# Patient Record
Sex: MALE | Race: WHITE | NOT HISPANIC OR LATINO | Employment: OTHER | URBAN - METROPOLITAN AREA
[De-identification: names, ages, dates, MRNs, and addresses within clinical notes are randomized per-mention and may not be internally consistent; named-entity substitution may affect disease eponyms.]

---

## 2017-08-24 ENCOUNTER — OFFICE VISIT (OUTPATIENT)
Dept: INTERNAL MEDICINE | Facility: CLINIC | Age: 72
End: 2017-08-24

## 2017-08-24 VITALS
SYSTOLIC BLOOD PRESSURE: 124 MMHG | RESPIRATION RATE: 21 BRPM | WEIGHT: 163 LBS | DIASTOLIC BLOOD PRESSURE: 84 MMHG | HEIGHT: 70 IN | BODY MASS INDEX: 23.34 KG/M2 | HEART RATE: 70 BPM

## 2017-08-24 DIAGNOSIS — R73.03 BORDERLINE DIABETES: ICD-10-CM

## 2017-08-24 DIAGNOSIS — G20 IDIOPATHIC PARKINSON'S DISEASE (HCC): ICD-10-CM

## 2017-08-24 DIAGNOSIS — E78.2 MIXED HYPERLIPIDEMIA: Primary | ICD-10-CM

## 2017-08-24 PROBLEM — M25.559 ARTHRALGIA OF HIP: Status: RESOLVED | Noted: 2017-08-24 | Resolved: 2017-08-24

## 2017-08-24 PROBLEM — M25.559 ARTHRALGIA OF HIP: Status: ACTIVE | Noted: 2017-08-24

## 2017-08-24 PROBLEM — E78.5 HLD (HYPERLIPIDEMIA): Status: ACTIVE | Noted: 2017-08-24

## 2017-08-24 PROCEDURE — 83036 HEMOGLOBIN GLYCOSYLATED A1C: CPT | Performed by: INTERNAL MEDICINE

## 2017-08-24 PROCEDURE — 99214 OFFICE O/P EST MOD 30 MIN: CPT | Performed by: INTERNAL MEDICINE

## 2017-08-24 RX ORDER — SILDENAFIL 100 MG/1
100 TABLET, FILM COATED ORAL AS NEEDED
Qty: 12 TABLET | Refills: 5 | OUTPATIENT
Start: 2017-08-24 | End: 2021-12-24

## 2017-08-24 RX ORDER — LEVODOPA AND CARBIDOPA 95; 23.75 MG/1; MG/1
CAPSULE, EXTENDED RELEASE ORAL
Refills: 6 | COMMUNITY
Start: 2017-08-17 | End: 2021-12-24

## 2017-08-24 NOTE — PROGRESS NOTES
Subjective   Braden Verdugo Jr is a 72 y.o. male.     History of Present Illness   For follow up of  Parkinson's disease.  He had brain stimulator place about one and 1/2 years ago and has done great.  He is down to one medication.  He had the stimulator repositioned and battery change last week.  Hyperlipidemia has not been checked for a while.  Hx of borderline diabetes and no new sx.    The following portions of the patient's history were reviewed and updated as appropriate: allergies, current medications, past medical history and problem list.    Review of Systems   Constitutional: Negative.    Respiratory: Negative.  Negative for cough, chest tightness and wheezing.    Cardiovascular: Negative.  Negative for chest pain, palpitations and leg swelling.   Gastrointestinal: Negative.  Negative for abdominal distention and abdominal pain.   Musculoskeletal: Positive for gait problem.   Neurological: Positive for tremors (mild parkinsonian tremor.) and speech difficulty.        Flat affect.       Objective   Physical Exam   Constitutional: He appears well-developed and well-nourished.   Neck: Normal range of motion. Neck supple.   Cardiovascular: Normal rate, regular rhythm and normal heart sounds.  Exam reveals no gallop and no friction rub.    No murmur heard.  Pulmonary/Chest: Effort normal and breath sounds normal. No respiratory distress. He has no wheezes. He has no rales. He exhibits no tenderness.   Abdominal: Soft. Bowel sounds are normal. He exhibits no distension. There is no tenderness.   Neurological:   Mild parkinsonian tremor left > right.  Flat affect.  Slow to get gait started.   Nursing note and vitals reviewed.      Assessment/Plan   Braden was seen today for establish care.    Diagnoses and all orders for this visit:    Mixed hyperlipidemia    Borderline diabetes    Idiopathic Parkinson's disease    Other orders  -     sildenafil (VIAGRA) 100 MG tablet; Take 1 tablet by mouth As Needed for erectile  dysfunction. NO FURTHER REFILLS UNTIL APPT WITH PCP    All problems are stable.  Same meds.  A1C = 5.3.  Recheck 6 months.

## 2017-08-25 LAB
EXPIRATION DATE: NORMAL
HBA1C MFR BLD: 5.3 %
Lab: NORMAL

## 2018-04-09 ENCOUNTER — OFFICE VISIT (OUTPATIENT)
Dept: INTERNAL MEDICINE | Facility: CLINIC | Age: 73
End: 2018-04-09

## 2018-04-09 VITALS
HEART RATE: 84 BPM | RESPIRATION RATE: 18 BRPM | WEIGHT: 161 LBS | SYSTOLIC BLOOD PRESSURE: 128 MMHG | DIASTOLIC BLOOD PRESSURE: 80 MMHG | BODY MASS INDEX: 23.1 KG/M2

## 2018-04-09 DIAGNOSIS — Z12.5 SCREENING FOR PROSTATE CANCER: ICD-10-CM

## 2018-04-09 DIAGNOSIS — R73.03 BORDERLINE DIABETES: ICD-10-CM

## 2018-04-09 DIAGNOSIS — F32.9 REACTIVE DEPRESSION: ICD-10-CM

## 2018-04-09 DIAGNOSIS — E78.2 MIXED HYPERLIPIDEMIA: Primary | ICD-10-CM

## 2018-04-09 DIAGNOSIS — N40.1 BENIGN PROSTATIC HYPERPLASIA WITH LOWER URINARY TRACT SYMPTOMS, SYMPTOM DETAILS UNSPECIFIED: ICD-10-CM

## 2018-04-09 DIAGNOSIS — N52.9 ERECTILE DYSFUNCTION OF ORGANIC ORIGIN: ICD-10-CM

## 2018-04-09 DIAGNOSIS — G20 IDIOPATHIC PARKINSON'S DISEASE (HCC): ICD-10-CM

## 2018-04-09 DIAGNOSIS — Z79.899 DRUG THERAPY: ICD-10-CM

## 2018-04-09 LAB
ALBUMIN SERPL-MCNC: 4.7 G/DL (ref 3.2–4.8)
ALBUMIN/GLOB SERPL: 2.1 G/DL (ref 1.5–2.5)
ALP SERPL-CCNC: 124 U/L (ref 25–100)
ALT SERPL W P-5'-P-CCNC: 6 U/L (ref 7–40)
ANION GAP SERPL CALCULATED.3IONS-SCNC: 7 MMOL/L (ref 3–11)
ARTICHOKE IGE QN: 115 MG/DL (ref 0–130)
AST SERPL-CCNC: 22 U/L (ref 0–33)
BILIRUB SERPL-MCNC: 1.2 MG/DL (ref 0.3–1.2)
BUN BLD-MCNC: 18 MG/DL (ref 9–23)
BUN/CREAT SERPL: 18 (ref 7–25)
CALCIUM SPEC-SCNC: 9.4 MG/DL (ref 8.7–10.4)
CHLORIDE SERPL-SCNC: 103 MMOL/L (ref 99–109)
CHOLEST SERPL-MCNC: 183 MG/DL (ref 0–200)
CO2 SERPL-SCNC: 30 MMOL/L (ref 20–31)
CREAT BLD-MCNC: 1 MG/DL (ref 0.6–1.3)
DEPRECATED RDW RBC AUTO: 44.7 FL (ref 37–54)
ERYTHROCYTE [DISTWIDTH] IN BLOOD BY AUTOMATED COUNT: 13.8 % (ref 11.3–14.5)
GFR SERPL CREATININE-BSD FRML MDRD: 73 ML/MIN/1.73
GLOBULIN UR ELPH-MCNC: 2.2 GM/DL
GLUCOSE BLD-MCNC: 107 MG/DL (ref 70–100)
HBA1C MFR BLD: 6.1 % (ref 4.8–5.6)
HCT VFR BLD AUTO: 45.9 % (ref 38.9–50.9)
HDLC SERPL-MCNC: 55 MG/DL (ref 40–60)
HGB BLD-MCNC: 15.3 G/DL (ref 13.1–17.5)
MCH RBC QN AUTO: 29.5 PG (ref 27–31)
MCHC RBC AUTO-ENTMCNC: 33.3 G/DL (ref 32–36)
MCV RBC AUTO: 88.6 FL (ref 80–99)
PLATELET # BLD AUTO: 268 10*3/MM3 (ref 150–450)
PMV BLD AUTO: 11 FL (ref 6–12)
POTASSIUM BLD-SCNC: 4.3 MMOL/L (ref 3.5–5.5)
PROT SERPL-MCNC: 6.9 G/DL (ref 5.7–8.2)
PSA SERPL-MCNC: 1.33 NG/ML (ref 0–4)
RBC # BLD AUTO: 5.18 10*6/MM3 (ref 4.2–5.76)
SODIUM BLD-SCNC: 140 MMOL/L (ref 132–146)
TESTOST SERPL-MCNC: 290.74 NG/DL (ref 86.98–780.1)
TRIGL SERPL-MCNC: 70 MG/DL (ref 0–150)
WBC NRBC COR # BLD: 8.98 10*3/MM3 (ref 3.5–10.8)

## 2018-04-09 PROCEDURE — 80053 COMPREHEN METABOLIC PANEL: CPT | Performed by: INTERNAL MEDICINE

## 2018-04-09 PROCEDURE — 36415 COLL VENOUS BLD VENIPUNCTURE: CPT | Performed by: INTERNAL MEDICINE

## 2018-04-09 PROCEDURE — 99214 OFFICE O/P EST MOD 30 MIN: CPT | Performed by: INTERNAL MEDICINE

## 2018-04-09 PROCEDURE — 83036 HEMOGLOBIN GLYCOSYLATED A1C: CPT | Performed by: INTERNAL MEDICINE

## 2018-04-09 PROCEDURE — 84403 ASSAY OF TOTAL TESTOSTERONE: CPT | Performed by: INTERNAL MEDICINE

## 2018-04-09 PROCEDURE — 80061 LIPID PANEL: CPT | Performed by: INTERNAL MEDICINE

## 2018-04-09 PROCEDURE — G0103 PSA SCREENING: HCPCS | Performed by: INTERNAL MEDICINE

## 2018-04-09 PROCEDURE — 85027 COMPLETE CBC AUTOMATED: CPT | Performed by: INTERNAL MEDICINE

## 2018-04-09 RX ORDER — TAMSULOSIN HYDROCHLORIDE 0.4 MG/1
1 CAPSULE ORAL NIGHTLY
Qty: 30 CAPSULE | Refills: 5 | OUTPATIENT
Start: 2018-04-09 | End: 2021-12-24

## 2018-04-09 RX ORDER — VENLAFAXINE 75 MG/1
150 TABLET ORAL DAILY
Refills: 2 | COMMUNITY
Start: 2018-03-05 | End: 2022-10-04

## 2018-04-09 NOTE — PROGRESS NOTES
Subjective   Braden Verdugo Jr is a 72 y.o. male.     History of Present Illness   For follow up of  Parkinson's disease on medication and brain stimulation followed at .  Depression now on effexor and stable.  BPH has to get up 3 times at night..  Borderline sugar and lipids, no sx.  ED is not responsive to viagra.    The following portions of the patient's history were reviewed and updated as appropriate: allergies, current medications, past family history, past medical history, past social history, past surgical history and problem list.    Review of Systems   Constitutional: Negative.  Negative for activity change, appetite change, fatigue and fever.   HENT: Negative.  Negative for dental problem, ear pain, hearing loss, postnasal drip, rhinorrhea, sinus pressure, sore throat, trouble swallowing and voice change.    Eyes: Negative.  Negative for redness and visual disturbance.   Respiratory: Negative.  Negative for cough, chest tightness, shortness of breath and wheezing.    Cardiovascular: Negative.  Negative for chest pain, palpitations and leg swelling.   Gastrointestinal: Negative.  Negative for abdominal distention, abdominal pain, blood in stool, constipation, diarrhea and nausea.   Endocrine: Negative.  Negative for polydipsia and polyuria.   Genitourinary: Positive for frequency. Negative for decreased urine volume, dysuria, hematuria and urgency.   Musculoskeletal: Negative.  Negative for arthralgias, back pain and neck pain.   Skin: Negative.  Negative for pallor and rash.   Allergic/Immunologic: Negative.    Neurological: Positive for tremors (some tremors and shuffling gait.  Weak voice.). Negative for dizziness, speech difficulty, weakness, numbness, headaches and confusion.   Hematological: Negative.  Negative for adenopathy.   Psychiatric/Behavioral: Negative.  Negative for agitation, behavioral problems, dysphoric mood, sleep disturbance, negative for hyperactivity and depressed mood. The patient  is not nervous/anxious.        Objective   Physical Exam   Constitutional: He is oriented to person, place, and time. He appears well-developed and well-nourished.   Weak voice and shuffling gait.   HENT:   Head: Normocephalic and atraumatic.   Right Ear: External ear normal.   Left Ear: External ear normal.   Nose: Nose normal.   Mouth/Throat: Oropharynx is clear and moist.   Eyes: Conjunctivae and EOM are normal. Pupils are equal, round, and reactive to light.   Fundoscopic exam:       The right eye shows no AV nicking and no hemorrhage.        The left eye shows no AV nicking and no hemorrhage.   Neck: Normal range of motion. Neck supple. No thyromegaly present.   Cardiovascular: Normal rate, regular rhythm, normal heart sounds and intact distal pulses.  Exam reveals no gallop and no friction rub.    No murmur heard.  Carotids normal.   Pulmonary/Chest: Effort normal and breath sounds normal. No respiratory distress. He has no wheezes. He has no rales. He exhibits no tenderness.   Stimulator in right upper chest.   Abdominal: Soft. Bowel sounds are normal. He exhibits no distension and no mass. There is no tenderness. No hernia.   Genitourinary: Rectum normal, prostate normal, testes normal and penis normal.   Musculoskeletal: Normal range of motion. He exhibits no edema.   Lymphadenopathy:     He has no cervical adenopathy.   Neurological: He is alert and oriented to person, place, and time. He has normal reflexes. No cranial nerve deficit.   As noted parkinson's findings.   Skin: Skin is warm and dry.   Psychiatric: He has a normal mood and affect. His behavior is normal. Judgment and thought content normal.   Nursing note and vitals reviewed.        Assessment/Plan   Braden was seen today for hyperlipidemia.    Diagnoses and all orders for this visit:    Mixed hyperlipidemia  -     Comprehensive Metabolic Panel  -     Lipid Panel    Idiopathic Parkinson's disease    Borderline diabetes  -     Hemoglobin  A1c    Benign prostatic hyperplasia with lower urinary tract symptoms, symptom details unspecified  -     tamsulosin (FLOMAX) 0.4 MG capsule 24 hr capsule; Take 1 capsule by mouth Every Night.    Screening for prostate cancer  -     PSA Screen    Drug therapy  -     CBC (No Diff)    Erectile dysfunction of organic origin  -     Testosterone    Reactive depression    Will try flomax for BPH.  Rest of problems are stable.  Recommended urology if he wants treatment for ED.  Same meds otherwise.  Recheck one year or prn.

## 2018-07-02 ENCOUNTER — TELEPHONE (OUTPATIENT)
Dept: INTERNAL MEDICINE | Facility: CLINIC | Age: 73
End: 2018-07-02

## 2018-07-02 NOTE — TELEPHONE ENCOUNTER
----- Message from Betty Hu sent at 7/2/2018  9:11 AM EDT -----  Contact: WIFE  KITTY ADAN CALLING FOR HER  NENITA ADAN, YESTERDAY HE STARTED HAVING CHILLS AND A HEADACHE. THROUGH THE NIGHT HE STARTED URINATING MORE FREQUENTLY AND NOW THERE IS BLOOD IN HIS URINE AND IT IS PAINFUL TO PEE.   TRANSFERRED CALL TO ELAINA

## 2019-08-26 ENCOUNTER — OFFICE VISIT (OUTPATIENT)
Dept: INTERNAL MEDICINE | Facility: CLINIC | Age: 74
End: 2019-08-26

## 2019-08-26 VITALS
BODY MASS INDEX: 23.91 KG/M2 | SYSTOLIC BLOOD PRESSURE: 102 MMHG | TEMPERATURE: 97.6 F | DIASTOLIC BLOOD PRESSURE: 60 MMHG | HEART RATE: 84 BPM | HEIGHT: 70 IN | WEIGHT: 167 LBS

## 2019-08-26 DIAGNOSIS — G20 IDIOPATHIC PARKINSON'S DISEASE (HCC): Primary | ICD-10-CM

## 2019-08-26 DIAGNOSIS — F32.9 REACTIVE DEPRESSION: ICD-10-CM

## 2019-08-26 DIAGNOSIS — I95.2 HYPOTENSION DUE TO DRUGS: ICD-10-CM

## 2019-08-26 PROCEDURE — 99213 OFFICE O/P EST LOW 20 MIN: CPT | Performed by: INTERNAL MEDICINE

## 2019-08-26 NOTE — PROGRESS NOTES
Subjective   Braden Verdugo Jr is a 74 y.o. male.     History of Present Illness   He has noticed some light headedness when he gets up for a long time.  Has been worse recently.  He has been on Rytary for about one year and his light headedness has been worse since then.  He called the neurology office and they said to come here.  No chest pain, sob or other sx.    The following portions of the patient's history were reviewed and updated as appropriate: allergies, current medications, past medical history, past social history and problem list.    Review of Systems   Constitutional: Negative for fatigue and fever.   Musculoskeletal: Positive for gait problem.   Neurological: Positive for tremors and light-headedness.       Objective   Physical Exam   Constitutional: He appears well-developed and well-nourished.   /60 by me and drops 10 points when standing.   Neck: Normal range of motion. Neck supple.   Cardiovascular: Normal rate and regular rhythm.   Neurological: He is alert.   Nursing note and vitals reviewed.      Assessment/Plan   Braden was seen today for hypotension.    Diagnoses and all orders for this visit:    Idiopathic Parkinson's disease (CMS/[facility])    Reactive depression    Hypotension due to drugs    Most likely the hypotension is related to the Rytary.  He will contact his neurologist.  Also is recommend stopping the flomax if he does not notice a lot of benefit.  He will call with questions.

## 2021-12-24 ENCOUNTER — HOSPITAL ENCOUNTER (EMERGENCY)
Facility: HOSPITAL | Age: 76
Discharge: HOME | End: 2021-12-24
Attending: EMERGENCY MEDICINE | Admitting: EMERGENCY MEDICINE

## 2021-12-24 ENCOUNTER — APPOINTMENT (OUTPATIENT)
Dept: GENERAL RADIOLOGY | Facility: HOSPITAL | Age: 76
End: 2021-12-24

## 2021-12-24 VITALS
BODY MASS INDEX: 21.33 KG/M2 | TEMPERATURE: 98.2 F | OXYGEN SATURATION: 100 % | HEART RATE: 79 BPM | SYSTOLIC BLOOD PRESSURE: 163 MMHG | DIASTOLIC BLOOD PRESSURE: 97 MMHG | WEIGHT: 144 LBS | HEIGHT: 69 IN | RESPIRATION RATE: 16 BRPM
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
## 2021-12-24 DIAGNOSIS — G20 PARKINSON'S DISEASE (HCC): ICD-10-CM

## 2021-12-24 DIAGNOSIS — S43.015A ANTERIOR DISLOCATION OF LEFT SHOULDER, INITIAL ENCOUNTER: Primary | ICD-10-CM

## 2021-12-24 PROCEDURE — 73030 X-RAY EXAM OF SHOULDER: CPT

## 2021-12-24 PROCEDURE — 99152 MOD SED SAME PHYS/QHP 5/>YRS: CPT

## 2021-12-24 PROCEDURE — 99283 EMERGENCY DEPT VISIT LOW MDM: CPT

## 2021-12-24 PROCEDURE — 71045 X-RAY EXAM CHEST 1 VIEW: CPT

## 2021-12-24 PROCEDURE — 96374 THER/PROPH/DIAG INJ IV PUSH: CPT

## 2021-12-24 PROCEDURE — 25010000002 PROPOFOL 10 MG/ML EMULSION: Performed by: EMERGENCY MEDICINE

## 2021-12-24 PROCEDURE — 25010000002 KETOROLAC TROMETHAMINE PER 15 MG: Performed by: EMERGENCY MEDICINE

## 2021-12-24 RX ORDER — RIVASTIGMINE 4.6 MG/24H
1 PATCH, EXTENDED RELEASE TRANSDERMAL DAILY
COMMUNITY

## 2021-12-24 RX ORDER — CLONAZEPAM 0.5 MG/1
1 TABLET ORAL NIGHTLY
COMMUNITY

## 2021-12-24 RX ORDER — KETOROLAC TROMETHAMINE 30 MG/ML
15 INJECTION, SOLUTION INTRAMUSCULAR; INTRAVENOUS ONCE
Status: COMPLETED | OUTPATIENT
Start: 2021-12-24 | End: 2021-12-24

## 2021-12-24 RX ORDER — MELOXICAM 15 MG/1
15 TABLET ORAL DAILY
Qty: 10 TABLET | Refills: 0 | Status: SHIPPED | OUTPATIENT
Start: 2021-12-24

## 2021-12-24 RX ORDER — OXYCODONE AND ACETAMINOPHEN 7.5; 325 MG/1; MG/1
1 TABLET ORAL ONCE
Status: COMPLETED | OUTPATIENT
Start: 2021-12-24 | End: 2021-12-24

## 2021-12-24 RX ORDER — ACETAMINOPHEN 500 MG
1000 TABLET ORAL EVERY 6 HOURS PRN
Qty: 30 TABLET | Refills: 0 | Status: SHIPPED | OUTPATIENT
Start: 2021-12-24

## 2021-12-24 RX ORDER — PROPOFOL 10 MG/ML
100 VIAL (ML) INTRAVENOUS ONCE
Status: COMPLETED | OUTPATIENT
Start: 2021-12-24 | End: 2021-12-24

## 2021-12-24 RX ADMIN — KETOROLAC TROMETHAMINE 15 MG: 30 INJECTION, SOLUTION INTRAMUSCULAR; INTRAVENOUS at 11:54

## 2021-12-24 RX ADMIN — PROPOFOL 60 MG: 10 INJECTION, EMULSION INTRAVENOUS at 13:44

## 2021-12-24 RX ADMIN — OXYCODONE HYDROCHLORIDE AND ACETAMINOPHEN 1 TABLET: 7.5; 325 TABLET ORAL at 11:56

## 2021-12-24 NOTE — ED PROVIDER NOTES
Subjective   Mr. Braden Verdugo Jr is a 76 y.o. male who presents to the ED with c/o fall. He has history of Parkinson's. He is accompanied to the ED by his wife. He had a fall today and now complains of pain to his left shoulder. He did not hit his head. He denies headache, neck pain, back pain, abdominal pain, or vomiting. There are no other acute symptoms at this time.      Fall  Mechanism of injury: fall    Injury location:  Shoulder/arm  Shoulder/arm injury location:  L shoulder  Incident location:  Home  Arrived directly from scene: yes    Fall:     Impact surface:  Hard floor    Point of impact: left shoulder.  Tetanus status:  Unknown  Prior to arrival data:     Loss of consciousness: no      Amnesic to event: no    Associated symptoms: no abdominal pain, no back pain, no headaches, no neck pain and no vomiting        Review of Systems   Gastrointestinal: Negative for abdominal pain and vomiting.   Musculoskeletal: Negative for back pain and neck pain.        Left shoulder pain   Neurological: Negative for headaches.   All other systems reviewed and are negative.      Past Medical History:   Diagnosis Date   • Parkinson's disease (HCC)        No Known Allergies    Past Surgical History:   Procedure Laterality Date   • DEEP BRAIN STIMULATOR PLACEMENT     • HERNIA REPAIR         Family History   Problem Relation Age of Onset   • Cancer Mother         myloma   • Diabetes Father        Social History     Socioeconomic History   • Marital status:    Tobacco Use   • Smoking status: Never Smoker   • Smokeless tobacco: Never Used   Substance and Sexual Activity   • Alcohol use: Yes     Comment: 5x week         Objective   Physical Exam  Vitals and nursing note reviewed.   Constitutional:       General: He is not in acute distress.     Appearance: He is well-developed.   HENT:      Head: Normocephalic and atraumatic.      Nose: Nose normal.   Eyes:      General: No scleral icterus.     Conjunctiva/sclera:  "Conjunctivae normal.   Cardiovascular:      Rate and Rhythm: Normal rate and regular rhythm.      Heart sounds: Normal heart sounds. No murmur heard.      Pulmonary:      Effort: Pulmonary effort is normal. No respiratory distress.      Breath sounds: Normal breath sounds.   Abdominal:      Palpations: Abdomen is soft.      Tenderness: There is no abdominal tenderness.   Musculoskeletal:         General: Normal range of motion.      Cervical back: Normal range of motion and neck supple.      Comments: Tenderness and decreased range of motion of the left shoulder. There is a hematoma over the left shoulder. No neck or back tenderness.   Skin:     General: Skin is warm and dry.   Neurological:      Mental Status: He is alert and oriented to person, place, and time.      Comments: He has Parkinsonian movement.   Psychiatric:         Behavior: Behavior normal.         Upper Extremity Dislocation    Date/Time: 12/24/2021 1:53 PM  Performed by: Jonny Rosales MD  Authorized by: Jonny Rosales MD   Consent: Verbal consent obtained. Written consent obtained.  Risks and benefits: risks, benefits and alternatives were discussed  Consent given by: patient and spouse  Patient understanding: patient states understanding of the procedure being performed  Imaging studies: imaging studies available  Required items: required blood products, implants, devices, and special equipment available  Patient identity confirmed: verbally with patient and provided demographic data  Time out: Immediately prior to procedure a \"time out\" was called to verify the correct patient, procedure, equipment, support staff and site/side marked as required.  Injury location: shoulder  Location details: left shoulder  Injury type: dislocation  Dislocation type: anterior  Hill-Sachs deformity: yes  Chronicity: new  Pre-procedure neurovascular assessment: neurovascularly intact  Pre-procedure distal perfusion: normal    Sedation:  Patient " sedated: yes  Sedation type: moderate (conscious) sedation  Sedatives: propofol and see MAR for details  Sedation start date/time: 12/24/2021 1:42 PM  Sedation end date/time: 12/24/2021 1:52 PM  Vitals: Vital signs were monitored during sedation.    Manipulation performed: yes  Reduction method: FARES techniques.  Reduction successful: yes  X-ray confirmed reduction: yes  Immobilization: sling  Post-procedure neurovascular assessment: post-procedure neurovascularly intact  Post-procedure distal perfusion: normal  Post-procedure neurological function: normal  Post-procedure range of motion: normal  Patient tolerance: patient tolerated the procedure well with no immediate complications               ED Course  ED Course as of 12/24/21 1745   Fri Dec 24, 2021   1352 Successful reduction of the left shoulder under procedural sedation.  Confirmatory imaging pending.  See procedure note for details. [RS]      ED Course User Index  [RS] Jonny Rosales MD     No results found for this or any previous visit (from the past 24 hour(s)).  Note: In addition to lab results from this visit, the labs listed above may include labs taken at another facility or during a different encounter within the last 24 hours. Please correlate lab times with ED admission and discharge times for further clarification of the services performed during this visit.    XR Shoulder 2+ View Left   Preliminary Result   1. Interval reduction of the left shoulder.   2. No evidence of acute bony trauma.   3. Chronic rotator cuff tear.               XR Shoulder 2+ View Left   Preliminary Result   Anterior dislocation of the humerus, with suspected mild   Hill-Sachs deformity. Advanced AC joint DJD noted.               XR Chest 1 View   Preliminary Result   No evidence of active chest disease.                 Vitals:    12/24/21 1421 12/24/21 1424 12/24/21 1425 12/24/21 1428   BP:   163/97    BP Location:       Patient Position:       Pulse: 78 78 77 79    Resp:       Temp:       TempSrc:       SpO2: 100% 100% 100% 100%   Weight:       Height:         Medications   oxyCODONE-acetaminophen (PERCOCET) 7.5-325 MG per tablet 1 tablet (1 tablet Oral Given 12/24/21 1156)   ketorolac (TORADOL) injection 15 mg (15 mg Intramuscular Given 12/24/21 1154)   Propofol (DIPRIVAN) injection 100 mg (60 mg Intravenous Given by Other 12/24/21 1344)     ECG/EMG Results (last 24 hours)     ** No results found for the last 24 hours. **        No orders to display                                              MDM  Number of Diagnoses or Management Options     Amount and/or Complexity of Data Reviewed  Tests in the radiology section of CPT®: reviewed  Independent visualization of images, tracings, or specimens: yes        Final diagnoses:   Anterior dislocation of left shoulder, initial encounter   Fall in home, initial encounter   Parkinson's disease (HCC)     DISCHARGE    Patient discharged in stable condition.    Reviewed implications of results, diagnosis, meds, responsibility to follow up, warning signs and symptoms of possible worsening, potential complications and reasons to return to ER.    Patient/Family voiced understanding of above instructions.    Discussed plan for discharge, as there is no emergent indication for admission.  Pt/family is agreeable and understands need for follow up and possible repeat testing.  Pt/family is aware that discharge does not mean that nothing is wrong but that it indicates no emergency is currently present that requires admission and they must continue care with follow-up as given below or with a physician of their choice.     FOLLOW-UP  Dain Barry MD  [address] [facility] [facility]  [place] [id number] [phone]          [facility]  [address] [facility] [address] [phone]    As needed, If symptoms worsen or ANY concerns.    Jarvis Cruz MD  [facility] [facility]  [place] [id number]  [phone]    Schedule an appointment as soon as possible for a visit            Medication List      New Prescriptions    acetaminophen 500 MG tablet  Commonly known as: TYLENOL  Take 2 tablets by mouth Every 6 (Six) Hours As Needed for Mild Pain  or Moderate Pain .     meloxicam 15 MG tablet  Commonly known as: MOBIC  Take 1 tablet by mouth Daily.        Changed    carbidopa-levodopa  MG per tablet  Commonly known as: SINEMET  What changed: Another medication with the same name was removed. Continue taking this medication, and follow the directions you see here.        Stop    sildenafil 100 MG tablet  Commonly known as: Viagra     tamsulosin 0.4 MG capsule 24 hr capsule  Commonly known as: Flomax           Where to Get Your Medications      These medications were sent to [facility]/pharmacy #[id number] - [place], [place] - [address] [facility] [phone]  - [phone]   [address] [place] [address]    Hours: 24-hours Phone: [phone]   · acetaminophen 500 MG tablet  · meloxicam 15 MG tablet       Documentation assistance provided by dario Raygoza.  Information recorded by the dario was done at my direction and has been verified and validated by me.     Angel Raygoza  12/24/21 1028       Jonny Rosales MD  12/24/21 [id number]

## 2022-10-04 PROCEDURE — 87070 CULTURE OTHR SPECIMN AEROBIC: CPT | Performed by: NURSE PRACTITIONER

## 2022-10-04 PROCEDURE — 87205 SMEAR GRAM STAIN: CPT | Performed by: NURSE PRACTITIONER

## 2022-10-04 PROCEDURE — U0004 COV-19 TEST NON-CDC HGH THRU: HCPCS | Performed by: NURSE PRACTITIONER

## 2022-10-04 PROCEDURE — U0005 INFEC AGEN DETEC AMPLI PROBE: HCPCS | Performed by: NURSE PRACTITIONER

## 2023-01-23 ENCOUNTER — HOSPITAL ENCOUNTER (EMERGENCY)
Facility: HOSPITAL | Age: 78
Discharge: HOME | End: 2023-01-23
Admitting: EMERGENCY MEDICINE
Payer: MEDICARE

## 2023-01-23 ENCOUNTER — APPOINTMENT (OUTPATIENT)
Dept: GENERAL RADIOLOGY | Facility: HOSPITAL | Age: 78
End: 2023-01-23
Payer: MEDICARE

## 2023-01-23 VITALS
HEIGHT: 68 IN | HEART RATE: 70 BPM | SYSTOLIC BLOOD PRESSURE: 171 MMHG | BODY MASS INDEX: 20.31 KG/M2 | DIASTOLIC BLOOD PRESSURE: 93 MMHG | OXYGEN SATURATION: 98 % | TEMPERATURE: 98.8 F | RESPIRATION RATE: 16 BRPM | WEIGHT: 134 LBS

## 2023-01-23 DIAGNOSIS — Z86.69 HISTORY OF PARKINSON'S DISEASE: ICD-10-CM

## 2023-01-23 DIAGNOSIS — M25.511 ACUTE PAIN OF RIGHT SHOULDER: ICD-10-CM

## 2023-01-23 DIAGNOSIS — Z86.39 HISTORY OF HYPERLIPIDEMIA: ICD-10-CM

## 2023-01-23 DIAGNOSIS — S43.014A ANTERIOR SHOULDER DISLOCATION, RIGHT, INITIAL ENCOUNTER: Primary | ICD-10-CM

## 2023-01-23 DIAGNOSIS — Z86.39 HISTORY OF DIABETES MELLITUS: ICD-10-CM

## 2023-01-23 DIAGNOSIS — Z86.59 HISTORY OF DEPRESSION: ICD-10-CM

## 2023-01-23 PROCEDURE — 73030 X-RAY EXAM OF SHOULDER: CPT

## 2023-01-23 PROCEDURE — 99152 MOD SED SAME PHYS/QHP 5/>YRS: CPT

## 2023-01-23 PROCEDURE — 99283 EMERGENCY DEPT VISIT LOW MDM: CPT

## 2023-01-23 PROCEDURE — 25010000002 PROPOFOL 10 MG/ML EMULSION: Performed by: PHYSICIAN ASSISTANT

## 2023-01-23 RX ORDER — CYCLOBENZAPRINE HCL 5 MG
5 TABLET ORAL 3 TIMES DAILY PRN
Qty: 9 TABLET | Refills: 0 | Status: SHIPPED | OUTPATIENT
Start: 2023-01-23 | End: 2023-01-26

## 2023-01-23 RX ORDER — HYDROCODONE BITARTRATE AND ACETAMINOPHEN 5; 325 MG/1; MG/1
1 TABLET ORAL EVERY 6 HOURS PRN
Qty: 12 TABLET | Refills: 0 | Status: SHIPPED | OUTPATIENT
Start: 2023-01-23 | End: 2023-01-26

## 2023-01-23 RX ORDER — PROPOFOL 10 MG/ML
100 VIAL (ML) INTRAVENOUS ONCE
Status: COMPLETED | OUTPATIENT
Start: 2023-01-23 | End: 2023-01-23

## 2023-01-23 RX ORDER — CYCLOBENZAPRINE HCL 10 MG
5 TABLET ORAL ONCE
Status: COMPLETED | OUTPATIENT
Start: 2023-01-23 | End: 2023-01-23

## 2023-01-23 RX ORDER — HYDROCODONE BITARTRATE AND ACETAMINOPHEN 5; 325 MG/1; MG/1
1 TABLET ORAL ONCE
Status: COMPLETED | OUTPATIENT
Start: 2023-01-23 | End: 2023-01-23

## 2023-01-23 RX ADMIN — HYDROCODONE BITARTRATE AND ACETAMINOPHEN 1 TABLET: 5; 325 TABLET ORAL at 23:48

## 2023-01-23 RX ADMIN — CYCLOBENZAPRINE 5 MG: 10 TABLET, FILM COATED ORAL at 23:48

## 2023-01-23 RX ADMIN — PROPOFOL 100 MG: 10 INJECTION, EMULSION INTRAVENOUS at 22:50

## 2023-01-24 NOTE — DISCHARGE INSTRUCTIONS
Symptomatic care is recommended. Take all medications as prescribed and instructed. Follow up with your orthopedic and primary care as directed or return to Emergency Department with worsening of symptoms.

## 2023-01-27 NOTE — ED PROVIDER NOTES
EMERGENCY DEPARTMENT ENCOUNTER    Pt Name: Braden Verdugo Jr  MRN: [id number]  Pt :   1945  Room Number:    Date of encounter:  2023  PCP: Dain Barry MD (Inactive)  ED Provider: VICENTE Shin    Historian: Patient and Daughter at bedside    HPI:  Chief Complaint: Right shoulder pain from fall    Context: Braden Verdugo Jr is a 77 y.o. male who presents to the ED c/o right shoulder pain following a fall. Patient has history of Parkinson's and falls occasionally as a result. Patient reports just not being steady and falling on his right side. He denies hitting his head or any loss of consciousness.  He is not on any blood thinners.  He does have history of a fall with a left shoulder dislocation.  He reports that his symptoms feel very similar to when he dislocated his left shoulder.  He reports that the pain in his right shoulder is worse with any kind of movement.  He reports that pain is alleviated with rest.  He has not tried anything for his symptoms.  No additional associated symptoms on exam.  HPI     REVIEW OF SYSTEMS  A chief complaint appropriate review of systems was completed and is negative except as noted in the HPI.     PAST MEDICAL HISTORY  Past Medical History:   Diagnosis Date   • Parkinson's disease (HCC)        PAST SURGICAL HISTORY  Past Surgical History:   Procedure Laterality Date   • DEEP BRAIN STIMULATOR PLACEMENT     • HERNIA REPAIR         FAMILY HISTORY  Family History   Problem Relation Age of Onset   • Cancer Mother         myloma   • Diabetes Father        SOCIAL HISTORY  Social History     Socioeconomic History   • Marital status:    Tobacco Use   • Smoking status: Never   • Smokeless tobacco: Never   Substance and Sexual Activity   • Alcohol use: Yes     Comment: 5x week       ALLERGIES  Meperidine    PHYSICAL EXAM  Physical Exam  GENERAL:   Appears in no acute distress.   HENT: Nares patent.  EYES: No scleral icterus.  CV: Regular rhythm, regular  rate.  RESPIRATORY: Normal effort.  No audible wheezes, rales or rhonchi.  ABDOMEN: Soft, nontender  MUSCULOSKELETAL: Deformity of right shoulder appreciated. Reduced ROM, unable to lift arm above the head.  Obvious deformity.  NEURO: Alert, moves all extremities, follows commands.  Neurovascularly intact.  SKIN: Warm, dry, no rash visualized.    I have reviewed the triage vital signs and nursing notes.    Physical Exam     LAB RESULTS      If labs were ordered, I independently reviewed the results and considered them in treating the patient.    RADIOLOGY  XR Shoulder 2+ View Right   Final Result   Impression:   Single view demonstrates improved alignment with likely reduced dislocation. No evidence of fracture.      Electronically Signed: Chace Parker     1/23/2023 11:00 PM EST     Workstation ID: [id number]      XR Shoulder 2+ View Right   Final Result   Impression:   Anteroinferior shoulder dislocation. No definite fracture.      Electronically Signed: Jonny Paez     1/23/2023 9:16 PM EST     Workstation ID: [id number]        [x] Radiologist's Report Reviewed:  I ordered and independently reviewed the above noted radiographic studies.  See radiologist's dictation for official interpretation.      PROCEDURES    Upper Extremity Dislocation    Date/Time: 1/27/2023 11:27 AM  Performed by: Donaldo Petersen PA  Authorized by: Kermit Gutierres MD   Consent: Verbal consent obtained.  Risks and benefits: risks, benefits and alternatives were discussed  Consent given by: guardian  Patient understanding: patient states understanding of the procedure being performed  Patient consent: the patient's understanding of the procedure matches consent given  Procedure consent: procedure consent matches procedure scheduled  Relevant documents: relevant documents present and verified  Test results: test results available and properly labeled  Imaging studies: imaging studies available  Required items: required blood products,  "implants, devices, and special equipment available  Patient identity confirmed: verbally with patient and arm band  Time out: Immediately prior to procedure a \"time out\" was called to verify the correct patient, procedure, equipment, support staff and site/side marked as required.  Injury location: shoulder  Location details: right shoulder  Injury type: dislocation  Dislocation type: anterior  Chronicity: new  Pre-procedure neurovascular assessment: neurovascularly intact  Pre-procedure distal perfusion: normal  Pre-procedure neurological function: normal  Pre-procedure range of motion: reduced    Anesthesia:  Local anesthesia used: no    Sedation:  Patient sedated: yes  Sedation type: anxiolysis  Sedatives: propofol  Sedation start date/time: 1/23/2023 9:32 PM  Sedation end date/time: 1/23/2023 9:50 PM  Vitals: Vital signs were monitored during sedation.    Manipulation performed: yes  Reduction method: external rotation  Reduction successful: yes  X-ray confirmed reduction: yes  Immobilization: sling  Post-procedure neurovascular assessment: post-procedure neurovascularly intact  Post-procedure distal perfusion: normal  Post-procedure neurological function: normal  Post-procedure range of motion: improved  Patient tolerance: patient tolerated the procedure well with no immediate complications          No orders to display       MEDICATIONS GIVEN IN ER    Medications   Propofol (DIPRIVAN) injection 100 mg (100 mg Intravenous Given by Other 1/23/23 2250)   HYDROcodone-acetaminophen (NORCO) 5-325 MG per tablet 1 tablet (1 tablet Oral Given 1/23/23 2348)   cyclobenzaprine (FLEXERIL) tablet 5 mg (5 mg Oral Given 1/23/23 2348)       MEDICAL DECISION MAKING, PROGRESS, and CONSULTS   Medical Decision Making  Acute pain of right shoulder: acute illness or injury  Anterior shoulder dislocation, right, initial encounter: acute illness or injury  History of depression: chronic illness or injury  History of diabetes mellitus: " chronic illness or injury  History of hyperlipidemia: chronic illness or injury  History of Parkinson's disease: chronic illness or injury  Amount and/or Complexity of Data Reviewed  Radiology: ordered.      Risk  Prescription drug management.          All labs have been independently reviewed by me.  All radiology studies have been reviewed by me and the radiologist dictating the report.  All EKG's have been independently viewed by me.    [] Discussed with radiology regarding test interpretation:    Discussion below represents my analysis of pertinent findings related to patient's condition, differential diagnosis, treatment plan and final disposition.    Differential diagnosis:  The differential diagnosis associated with the patient's presentation includes: fracture, dislocation, tendon or ligament injury.     Additional sources  Discussed/ obtained information from independent historians:   [] Spouse  [] Parent  [x] Family member  [] Friend  [] EMS   [] Other:  External (non-ED) record review:   [] Inpatient record:   [x] Office record: neurosurgery records confirming Parkinson's diagnosis and treatment   [] Outpatient record:   [] Prior Outpatient labs:   [] Prior Outpatient radiology:   [] Primary Care record:   [] Outside ED record:   [] Other:   Patient's care impacted by:   [x] Diabetes  [] Hypertension  [x] Hyperlipidemia  [] Coronary Artery Disease   [] COPD   [] Cancer   [] Tobacco Abuse   [] Substance Abuse    [x] Other:  Parkinsons disease  Care significantly affected by Social Determinants of Health (housing and economic circumstances, unemployment)    [] Yes     [x] No   If yes, Patient's care significantly limited by  Social Determinants of Health including:   [] Inadequate housing   [] Low income   [] Alcoholism and drug addiction in family   [] Problems related to primary support group   [] Unemployment   [] Problems related to employment   [] Other Social Determinants of Health:     Shared  decision making:  I had a discussion with the patient/family regarding diagnosis, diagnostic results, treatment plan, and medications.  The patient/family indicated understanding of these instructions.  I spent adequate time at the bedside preceding discharge necessary to personally discuss the aftercare instructions, giving patient education, providing explanations of the results of our evaluations/findings, and my decision making to assure that the patient/family understand the plan of care.  Time was allotted to answer questions at that time and throughout the ED course.  Emphasis was placed on timely follow-up after discharge.  I also discussed the potential for the development of an acute emergent condition requiring further evaluation, admission, or even surgical intervention. I discussed that we found nothing during the visit today indicating the need for further workup, admission, or the presence of an unstable medical condition.  I encouraged the patient to return to the emergency department immediately for ANY concerns, worsening, new complaints, or if symptoms persist and unable to seek follow-up in a timely fashion.  The patient/family expressed understanding and agreement with this plan.  The patient will follow-up with primary care and orthopedic for reevaluation.     Orders placed during this visit:  Orders Placed This Encounter   Procedures   • Orthopedic Injury Treatment   • XR Shoulder 2+ View Right   • XR Shoulder 2+ View Right       I considered prescription management  with:   [x] Pain medication implemented as prescribed  [] Antiviral  [] Antibiotic   [] Other:   Rationale:    ED Course:    ED Course as of 01/27/23 1136   Mon Jan 23, 2023   4246 Shoulder reduction completed with attending Dr. Gutierres at bedside.  Successful reduction, will be confirmed by x-ray. [JG]   0171 On reassessment patient with complaints of some pain in the shoulder.  He is alert and oriented and answers questions  appropriately.  Reviewed plan of care with patient and family member at bedside.  They will follow-up outpatient with orthopedic next week. [JG]   6222 The Pt presents with acute right pain after fall with evidence of right shoulder dislocation on XR. The Pt is otherwise well appearing without concurrent Fx, overt ligamentous tear, neurovascular injury, or compartment syndrome.Right shoulder was reduced at bedside with conscious sedation and post reduction Xray shows successful reduction. Patient pain was controlled and patient discharged with ortho follow up. [JG]      ED Course User Index  [JG] Donaldo Petersen PA            DIAGNOSIS  Final diagnoses:   Anterior shoulder dislocation, right, initial encounter   Acute pain of right shoulder   History of Parkinson's disease   History of diabetes mellitus   History of hyperlipidemia   History of depression       DISPOSITION    ED Disposition     ED Disposition   Discharge    Condition   Stable    Comment   --             Please note that portions of this document were completed with voice recognition software.      Donaldo Petersen PA  01/27/23 2502

## 2023-06-07 ENCOUNTER — HOSPITAL ENCOUNTER (EMERGENCY)
Facility: HOSPITAL | Age: 78
Discharge: HOME | End: 2023-06-07
Attending: EMERGENCY MEDICINE | Admitting: EMERGENCY MEDICINE
Payer: MEDICARE

## 2023-06-07 ENCOUNTER — APPOINTMENT (OUTPATIENT)
Dept: CT IMAGING | Facility: HOSPITAL | Age: 78
End: 2023-06-07
Payer: MEDICARE

## 2023-06-07 VITALS
DIASTOLIC BLOOD PRESSURE: 86 MMHG | WEIGHT: 134 LBS | TEMPERATURE: 98 F | HEIGHT: 69 IN | SYSTOLIC BLOOD PRESSURE: 156 MMHG | OXYGEN SATURATION: 96 % | BODY MASS INDEX: 19.85 KG/M2 | HEART RATE: 75 BPM | RESPIRATION RATE: 16 BRPM

## 2023-06-07 DIAGNOSIS — S01.81XA CHIN LACERATION, INITIAL ENCOUNTER: ICD-10-CM
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
## 2023-06-07 DIAGNOSIS — S02.19XA CLOSED FRACTURE OF TEMPORAL BONE, INITIAL ENCOUNTER: ICD-10-CM

## 2023-06-07 DIAGNOSIS — S02.609A CLOSED FRACTURE OF LEFT SIDE OF MANDIBLE, UNSPECIFIED MANDIBULAR SITE, INITIAL ENCOUNTER: ICD-10-CM

## 2023-06-07 DIAGNOSIS — Z86.69 HISTORY OF PARKINSON'S DISEASE: ICD-10-CM

## 2023-06-07 PROCEDURE — 72125 CT NECK SPINE W/O DYE: CPT

## 2023-06-07 PROCEDURE — 70486 CT MAXILLOFACIAL W/O DYE: CPT

## 2023-06-07 PROCEDURE — 70450 CT HEAD/BRAIN W/O DYE: CPT

## 2023-06-07 PROCEDURE — 99283 EMERGENCY DEPT VISIT LOW MDM: CPT

## 2023-06-07 RX ORDER — LIDOCAINE HYDROCHLORIDE AND EPINEPHRINE 10; 10 MG/ML; UG/ML
20 INJECTION, SOLUTION INFILTRATION; PERINEURAL ONCE
Status: DISCONTINUED | OUTPATIENT
Start: 2023-06-07 | End: 2023-06-07 | Stop reason: HOSPADM

## 2023-06-07 RX ORDER — ONDANSETRON 4 MG/1
4 TABLET, ORALLY DISINTEGRATING ORAL ONCE
Status: DISCONTINUED | OUTPATIENT
Start: 2023-06-07 | End: 2023-06-07 | Stop reason: HOSPADM

## 2023-06-07 RX ORDER — HYDROCODONE BITARTRATE AND ACETAMINOPHEN 10; 325 MG/1; MG/1
.5-1 TABLET ORAL EVERY 6 HOURS PRN
Qty: 10 TABLET | Refills: 0 | Status: SHIPPED | OUTPATIENT
Start: 2023-06-07

## 2023-06-07 RX ORDER — HYDROCODONE BITARTRATE AND ACETAMINOPHEN 7.5; 325 MG/1; MG/1
1 TABLET ORAL EVERY 6 HOURS PRN
Qty: 12 TABLET | Refills: 0 | Status: SHIPPED | OUTPATIENT
Start: 2023-06-07 | End: 2023-06-07 | Stop reason: ALTCHOICE

## 2023-06-07 RX ORDER — NALOXONE HYDROCHLORIDE 4 MG/.1ML
SPRAY NASAL
Qty: 2 EACH | Refills: 0 | Status: SHIPPED | OUTPATIENT
Start: 2023-06-07

## 2023-06-07 RX ORDER — HYDROCODONE BITARTRATE AND ACETAMINOPHEN 7.5; 325 MG/1; MG/1
1 TABLET ORAL ONCE
Status: COMPLETED | OUTPATIENT
Start: 2023-06-07 | End: 2023-06-07

## 2023-06-07 RX ORDER — ONDANSETRON 4 MG/1
4 TABLET, ORALLY DISINTEGRATING ORAL EVERY 6 HOURS PRN
Qty: 15 TABLET | Refills: 0 | Status: SHIPPED | OUTPATIENT
Start: 2023-06-07

## 2023-06-07 RX ADMIN — HYDROCODONE BITARTRATE AND ACETAMINOPHEN 1 TABLET: 7.5; 325 TABLET ORAL at 17:34

## 2023-06-07 NOTE — ED PROVIDER NOTES
Subjective   History of Present Illness  Pt is a 77 yo male presenting to ED with complaints of jaw pain and chin laceration after a fall. PMHx significant for Parkinsons. Pt and wife providing hx. Pt has hx of multiple falls due to his Parkinsons and fell just PTA landing on wood floor. He has laceration to chin and also complains of pain in left jaw. He has blood coming out of left ear. He does not take blood thinners. He denies headache or LOC. He denies neck pain. He denies CP, SOB, abdominal pain, mid to low back pain or extremity pain.     History provided by:  Patient, spouse and medical records    Review of Systems   HENT:  Positive for facial swelling (left jaw pain, chin laceration).    Eyes:  Negative for visual disturbance.   Respiratory:  Negative for shortness of breath.    Cardiovascular:  Negative for chest pain.   Gastrointestinal:  Negative for abdominal pain, nausea and vomiting.   Musculoskeletal:  Negative for arthralgias, back pain and neck pain.   Skin:  Positive for wound.   Neurological:  Negative for dizziness, weakness, numbness and headaches.        No new neuro changes from baseline.     Psychiatric/Behavioral:  Negative for confusion.      Past Medical History:   Diagnosis Date    Parkinson's disease        Allergies   Allergen Reactions    Meperidine Other (See Comments)       Past Surgical History:   Procedure Laterality Date    DEEP BRAIN STIMULATOR PLACEMENT      HERNIA REPAIR         Family History   Problem Relation Age of Onset    Cancer Mother         myloma    Diabetes Father        Social History     Socioeconomic History    Marital status:    Tobacco Use    Smoking status: Never    Smokeless tobacco: Never   Substance and Sexual Activity    Alcohol use: Yes     Comment: 5x week           Objective   Physical Exam  Constitutional:       Comments: Thin and frail     HENT:      Head: Laceration (chin, 3 cm irregular. Bleeding controlled) present.      Jaw: Tenderness  (eft) present.      Comments: He is able to open his mouth about 3 cm but with pain       Left Ear: Drainage (blood) present.   Eyes:      Extraocular Movements: Extraocular movements intact.      Conjunctiva/sclera: Conjunctivae normal.      Pupils: Pupils are equal, round, and reactive to light.   Cardiovascular:      Rate and Rhythm: Normal rate.      Heart sounds: Normal heart sounds.   Pulmonary:      Effort: Pulmonary effort is normal. No respiratory distress.   Abdominal:      Palpations: Abdomen is soft.      Tenderness: There is no abdominal tenderness.   Musculoskeletal:         General: Normal range of motion.      Cervical back: Normal range of motion and neck supple.   Skin:     General: Skin is warm.   Neurological:      Mental Status: He is alert and oriented to person, place, and time.      Comments: Parkinson Tremor        Laceration Repair    Date/Time: 6/7/2023 11:26 PM  Performed by: Yolanda Richardson PA  Authorized by: Justin Mantilla DO     Consent:     Consent obtained:  Verbal    Consent given by:  Patient and spouse    Risks, benefits, and alternatives were discussed: yes      Risks discussed:  Infection, need for additional repair, nerve damage, pain, poor cosmetic result, poor wound healing, retained foreign body, tendon damage and vascular damage    Alternatives discussed:  No treatment  Universal protocol:     Imaging studies available: yes      Patient identity confirmed:  Verbally with patient  Anesthesia:     Anesthesia method:  Local infiltration    Local anesthetic:  Lidocaine 1% WITH epi  Laceration details:     Location:  Face    Face location:  Chin    Length (cm):  3  Pre-procedure details:     Preparation:  Patient was prepped and draped in usual sterile fashion  Exploration:     Contaminated: no    Treatment:     Area cleansed with:  Saline and povidone-iodine    Amount of cleaning:  Standard  Skin repair:     Repair method:  Sutures    Suture size:  4-0    Suture material:   Prolene    Suture technique:  Simple interrupted    Number of sutures:  7  Approximation:     Approximation:  Close  Repair type:     Repair type:  Simple  Post-procedure details:     Dressing:  Open (no dressing)    Procedure completion:  Tolerated well, no immediate complications           ED Course  ED Course as of 06/07/23 2327 Wed Jun 07, 2023   1628 Discussed patient with Dr. Mantilla. Will consult ENT.  [RT]   1634 Contacted ENT on call Dr. Vega. Spoke with office staff who will relay message about patient and call back to ED.  [RT]   1636 ENT called back and reports patient needs to be transferred to [facility].  [RT]   1640 Contacted [facility] and they will call back to [facility].  [RT]   1651 Discussed patient with Dr. Nguyen with facial trauma. Went over CT scans. If patient able to tolerate PO and comfortable going home cleared and can follow up with [facility] outpatient tomorrow.  [RT]   1700 Re-examined patient and discussed [facility] consult. They prefer to go home and he is able to tolerate PO in ED.   Will repair laceration and then discharge home.  [RT]      ED Course User Index  [RT] Yolanda Richardson PA      No results found for this or any previous visit (from the past 24 hour(s)).  Note: In addition to lab results from this visit, the labs listed above may include labs taken at another facility or during a different encounter within the last 24 hours. Please correlate lab times with ED admission and discharge times for further clarification of the services performed during this visit.    CT Head Without Contrast   Final Result   Impression:      1. No acute findings.   2. Mild volume loss secondary to cerebral atrophy.   3. There is scatter artifact secondary to bilateral brain stimulators in place.            Electronically Signed: Rick Davis     6/7/2023 4:08 PM EDT     Workstation ID: [id number]      CT Facial Bones Without Contrast   Final Result   Impression:   Comminuted/impacted fracture of the left mandibular  "condyle. Additional fracture of the adjacent temporal bone which appears isolated to the anterior and inferior walls of the left external auditory canal. Surrounding subcutaneous emphysema is present.    Intermediate density within the EAC may represent blood clot.         Electronically Signed: Eldon Humphreys     6/7/2023 4:12 PM EDT     Workstation ID: [id number]      CT Cervical Spine Without Contrast   Final Result   Impression:      1. No acute fracture or dislocation within the cervical spine.   2. Degenerative changes with varying degrees of canal stenosis and neural foraminal narrowing.   3. Note is made of a left mandibular fracture with presumed hemorrhage in the left external auditory canal. This was dictated under the separate CT facial bone report.            Electronically Signed: Rick Ryan     6/7/2023 4:14 PM EDT     Workstation ID: [id number]        Vitals:    06/07/23 1354   BP: 156/86   BP Location: Left arm   Patient Position: Sitting   Pulse: 75   Resp: 16   Temp: 98 °F (36.7 °C)   TempSrc: Oral   SpO2: 96%   Weight: 60.8 kg (134 lb)   Height: 175.3 cm (69\")     Medications   HYDROcodone-acetaminophen (NORCO) 7.5-325 MG per tablet 1 tablet (1 tablet Oral Given 6/7/23 1734)     ECG/EMG Results (last 24 hours)       ** No results found for the last 24 hours. **          No orders to display                                    JOSÉ MIGUEL reviewed by Justin Mantilla, DO       Medical Decision Making  Pt is a 77 yo male presenting to ED with complaints of chin laceration and facial pain after a fall. Patient with findings of left mandibular comminuted and impacted fracture as well as Temporal bone fracture. Brain and C spine without emergent findings. Discussed patient with Dr. Vega and he recommended [facility] consult. Discussed patient with [facility] Facial trauma Dr. Nguyen and he is agreeable with discharge home and outpatient f/u or transfer if unable to tolerate PO. Patient prefers to go home and is able to " tolerate PO pain meds. Laceration repaired in ED. Discharged home on Norco and Zofran. Wife is main caregiver and agreeable with plan.     DDx  Fracture, Contusion, SAH, Laceration, Dislocation     Problems Addressed:  Chin laceration, initial encounter: complicated acute illness or injury  Closed fracture of left side of mandible, unspecified mandibular site, initial encounter: complicated acute illness or injury  Closed fracture of temporal bone, initial encounter: complicated acute illness or injury  Fall, initial encounter: complicated acute illness or injury  History of Parkinson's disease: chronic illness or injury    Amount and/or Complexity of Data Reviewed  Independent Historian: spouse  External Data Reviewed: notes.     Details: PCP, Neuro  Radiology: ordered. Decision-making details documented in ED Course.    Risk  Prescription drug management.        Final diagnoses:   Fall, initial encounter   Chin laceration, initial encounter   Closed fracture of left side of mandible, unspecified mandibular site, initial encounter   Closed fracture of temporal bone, initial encounter   History of Parkinson's disease       ED Disposition  ED Disposition       ED Disposition   Discharge    Condition   Stable    Comment   --                  Maxillofacial Surgery  [phone]    THEY ARE SUPPOSED TO CALL YOU TOMORROW WITH AN APPOINTMENT TIME        [facility]  [address] [place] [id number] [phone]    If symptoms worsen         Medication List        New Prescriptions      HYDROcodone-acetaminophen  MG per tablet  Commonly known as: NORCO  Take 0.5-1 tablets by mouth Every 6 (Six) Hours As Needed for Moderate Pain for up to 10 doses.     naloxone 4 MG/0.1ML nasal spray  Commonly known as: NARCAN  Call 911. Don't prime. [place] in 1 nostril for overdose. Repeat in 2-3 minutes in other nostril if no or minimal breathing/responsiveness.      ondansetron ODT 4 MG disintegrating tablet  Commonly known as: ZOFRAN-ODT  Place 1 tablet on the tongue Every 6 (Six) Hours As Needed for Nausea or Vomiting for up to 15 doses.               Where to Get Your Medications        These medications were sent to [facility]/pharmacy #[id number] - [place], [place] - [address] - [phone]  - [phone]   [address] [address]      Phone: [phone]   HYDROcodone-acetaminophen  MG per tablet  naloxone 4 MG/0.1ML nasal spray  ondansetron ODT 4 MG disintegrating tablet            Yolanda Richardson PA  06/07/23 2302